# Patient Record
Sex: OTHER/UNKNOWN | Race: WHITE | NOT HISPANIC OR LATINO | URBAN - METROPOLITAN AREA
[De-identification: names, ages, dates, MRNs, and addresses within clinical notes are randomized per-mention and may not be internally consistent; named-entity substitution may affect disease eponyms.]

---

## 2017-01-09 ENCOUNTER — APPOINTMENT (OUTPATIENT)
Dept: URBAN - METROPOLITAN AREA CLINIC 290 | Age: 62
Setting detail: DERMATOLOGY
End: 2017-01-10

## 2017-01-09 DIAGNOSIS — Z41.9 ENCOUNTER FOR PROCEDURE FOR PURPOSES OTHER THAN REMEDYING HEALTH STATE, UNSPECIFIED: ICD-10-CM

## 2017-01-09 PROCEDURE — OTHER FILLERS: OTHER

## 2017-01-09 PROCEDURE — OTHER BOTOX (U OR CC): OTHER

## 2017-01-09 PROCEDURE — OTHER OTHER (COSMETIC): OTHER

## 2017-04-26 ENCOUNTER — APPOINTMENT (OUTPATIENT)
Dept: URBAN - METROPOLITAN AREA CLINIC 290 | Age: 62
Setting detail: DERMATOLOGY
End: 2017-04-27

## 2017-04-26 DIAGNOSIS — Z41.9 ENCOUNTER FOR PROCEDURE FOR PURPOSES OTHER THAN REMEDYING HEALTH STATE, UNSPECIFIED: ICD-10-CM

## 2017-04-26 PROCEDURE — OTHER BOTOX (U OR CC): OTHER

## 2017-04-26 PROCEDURE — OTHER OTHER (COSMETIC): OTHER

## 2017-04-26 NOTE — PROCEDURE: OTHER (COSMETIC)
Other (Free Text): Recommend 1 Juvederm Ultra Plus to temples, 2 Voluma to cheeks in Y lift technique for volume loss.  Recommend 2 Juvederm Ultra Plus to mandibular , and 1-2 Voluma to chin in
Detail Level: Zone

## 2017-04-26 NOTE — PROCEDURE: BOTOX (U OR CC)
Lateral Platysmal Bands Units: 0
Dilution (U/0.1 Cc): 2.4
Consent: Verbal consent obtained. Risks include but not limited to lid/brow ptosis, bruising, swelling, diplopia, temporary effect, incomplete chemical denervation.
Additional Area 1 Location: Crows feet
Glabellar Complex Units: 20
Additional Area 2 Units: 10
Price (Use Numbers Only, No Special Characters Or $): 400.
Additional Area 4 Location: Right lower lid
Additional Area 2 Location: Chin
Document As Units Or Cc?: units
Additional Area 5 Location: Lateral oo
Additional Area 6 Location: Jaw/perioral
Additional Area 3 Location: Lip
Detail Level: Zone

## 2018-02-05 ENCOUNTER — APPOINTMENT (OUTPATIENT)
Dept: URBAN - METROPOLITAN AREA CLINIC 290 | Age: 63
Setting detail: DERMATOLOGY
End: 2018-02-07

## 2018-02-05 DIAGNOSIS — Z41.9 ENCOUNTER FOR PROCEDURE FOR PURPOSES OTHER THAN REMEDYING HEALTH STATE, UNSPECIFIED: ICD-10-CM

## 2018-02-05 PROCEDURE — OTHER FILLERS: OTHER
